# Patient Record
Sex: FEMALE | Race: WHITE | NOT HISPANIC OR LATINO | URBAN - METROPOLITAN AREA
[De-identification: names, ages, dates, MRNs, and addresses within clinical notes are randomized per-mention and may not be internally consistent; named-entity substitution may affect disease eponyms.]

---

## 2018-03-06 PROBLEM — Z00.00 ENCOUNTER FOR PREVENTIVE HEALTH EXAMINATION: Status: ACTIVE | Noted: 2018-03-06

## 2018-03-28 ENCOUNTER — OUTPATIENT (OUTPATIENT)
Dept: OUTPATIENT SERVICES | Facility: HOSPITAL | Age: 23
LOS: 1 days | End: 2018-03-28
Payer: COMMERCIAL

## 2018-03-28 ENCOUNTER — APPOINTMENT (OUTPATIENT)
Dept: SURGERY | Facility: CLINIC | Age: 23
End: 2018-03-28

## 2018-03-28 ENCOUNTER — APPOINTMENT (OUTPATIENT)
Dept: CT IMAGING | Facility: HOSPITAL | Age: 23
End: 2018-03-28
Payer: COMMERCIAL

## 2018-03-28 VITALS
OXYGEN SATURATION: 98 % | DIASTOLIC BLOOD PRESSURE: 70 MMHG | WEIGHT: 135.8 LBS | HEART RATE: 77 BPM | TEMPERATURE: 99 F | HEIGHT: 67 IN | RESPIRATION RATE: 16 BRPM | SYSTOLIC BLOOD PRESSURE: 116 MMHG

## 2018-03-28 DIAGNOSIS — Z41.9 ENCOUNTER FOR PROCEDURE FOR PURPOSES OTHER THAN REMEDYING HEALTH STATE, UNSPECIFIED: Chronic | ICD-10-CM

## 2018-03-28 DIAGNOSIS — Z01.818 ENCOUNTER FOR OTHER PREPROCEDURAL EXAMINATION: ICD-10-CM

## 2018-03-28 DIAGNOSIS — Z98.890 OTHER SPECIFIED POSTPROCEDURAL STATES: Chronic | ICD-10-CM

## 2018-03-28 DIAGNOSIS — N80.9 ENDOMETRIOSIS, UNSPECIFIED: ICD-10-CM

## 2018-03-28 PROBLEM — K59.00 CONSTIPATION: Status: ACTIVE | Noted: 2018-03-28

## 2018-03-28 PROBLEM — Z87.42 HISTORY OF ENDOMETRIOSIS: Status: RESOLVED | Noted: 2018-03-28 | Resolved: 2018-03-28

## 2018-03-28 PROCEDURE — 93010 ELECTROCARDIOGRAM REPORT: CPT

## 2018-03-28 PROCEDURE — 71250 CT THORAX DX C-: CPT | Mod: 26

## 2018-03-28 PROCEDURE — 71250 CT THORAX DX C-: CPT

## 2018-03-28 RX ORDER — IBUPROFEN 800 MG/1
TABLET, FILM COATED ORAL
Refills: 0 | Status: ACTIVE | COMMUNITY

## 2018-03-28 RX ORDER — RANITIDINE HCL 150 MG
150 CAPSULE ORAL DAILY
Refills: 0 | Status: ACTIVE | COMMUNITY

## 2018-03-28 RX ORDER — ACETAMINOPHEN 500 MG/1
TABLET ORAL
Refills: 0 | Status: ACTIVE | COMMUNITY

## 2018-03-28 RX ORDER — LORATADINE 5 MG
TABLET,CHEWABLE ORAL DAILY
Refills: 0 | Status: ACTIVE | COMMUNITY

## 2018-03-28 RX ORDER — EUCALYPT/MEN/CAMPH/TURP/WH.PET
OINTMENT (GRAM) TOPICAL
Refills: 0 | Status: ACTIVE | COMMUNITY

## 2018-03-28 RX ORDER — CETIRIZINE HCL 10 MG
10 TABLET ORAL DAILY
Refills: 0 | Status: ACTIVE | COMMUNITY

## 2018-03-28 NOTE — H&P PST ADULT - HISTORY OF PRESENT ILLNESS
22 year old reports heavy and very painful menstruations. She has a working diagnosis of endometriosis.

## 2018-03-28 NOTE — H&P PST ADULT - NSANTHOSAYNRD_GEN_A_CORE
No. JADA screening performed.  STOP BANG Legend: 0-2 = LOW Risk; 3-4 = INTERMEDIATE Risk; 5-8 = HIGH Risk

## 2018-03-28 NOTE — H&P PST ADULT - ADDITIONAL PE
No medical contraindication to operative hysteroscopy ,laparoscopic excision of endometriosis, pending normal labs.

## 2018-03-28 NOTE — ASU PATIENT PROFILE, ADULT - VISION (WITH CORRECTIVE LENSES IF THE PATIENT USUALLY WEARS THEM):
Glasses for distance/Partially impaired: cannot see medication labels or newsprint, but can see obstacles in path, and the surrounding layout; can count fingers at arm's length

## 2018-03-29 ENCOUNTER — APPOINTMENT (OUTPATIENT)
Dept: THORACIC SURGERY | Facility: CLINIC | Age: 23
End: 2018-03-29
Payer: COMMERCIAL

## 2018-03-29 VITALS
WEIGHT: 134.92 LBS | SYSTOLIC BLOOD PRESSURE: 124 MMHG | RESPIRATION RATE: 16 BRPM | TEMPERATURE: 98 F | OXYGEN SATURATION: 100 % | DIASTOLIC BLOOD PRESSURE: 60 MMHG | HEIGHT: 66 IN | HEART RATE: 85 BPM

## 2018-03-29 VITALS
RESPIRATION RATE: 19 BRPM | HEART RATE: 87 BPM | BODY MASS INDEX: 21.21 KG/M2 | WEIGHT: 132 LBS | OXYGEN SATURATION: 98 % | HEIGHT: 66 IN | TEMPERATURE: 98.3 F | SYSTOLIC BLOOD PRESSURE: 123 MMHG | DIASTOLIC BLOOD PRESSURE: 72 MMHG

## 2018-03-29 DIAGNOSIS — K59.00 CONSTIPATION, UNSPECIFIED: ICD-10-CM

## 2018-03-29 DIAGNOSIS — J45.909 UNSPECIFIED ASTHMA, UNCOMPLICATED: ICD-10-CM

## 2018-03-29 DIAGNOSIS — Z82.5 FAMILY HISTORY OF ASTHMA AND OTHER CHRONIC LOWER RESPIRATORY DISEASES: ICD-10-CM

## 2018-03-29 DIAGNOSIS — R07.1 CHEST PAIN ON BREATHING: ICD-10-CM

## 2018-03-29 DIAGNOSIS — Z87.42 PERSONAL HISTORY OF OTHER DISEASES OF THE FEMALE GENITAL TRACT: ICD-10-CM

## 2018-03-29 DIAGNOSIS — Z80.9 FAMILY HISTORY OF MALIGNANT NEOPLASM, UNSPECIFIED: ICD-10-CM

## 2018-03-29 DIAGNOSIS — R10.2 PELVIC AND PERINEAL PAIN: ICD-10-CM

## 2018-03-29 DIAGNOSIS — K21.9 GASTRO-ESOPHAGEAL REFLUX DISEASE W/OUT ESOPHAGITIS: ICD-10-CM

## 2018-03-29 DIAGNOSIS — Z82.49 FAMILY HISTORY OF ISCHEMIC HEART DISEASE AND OTHER DISEASES OF THE CIRCULATORY SYSTEM: ICD-10-CM

## 2018-03-29 DIAGNOSIS — R06.02 SHORTNESS OF BREATH: ICD-10-CM

## 2018-03-29 PROCEDURE — 99205 OFFICE O/P NEW HI 60 MIN: CPT

## 2018-03-29 RX ORDER — BACILLUS COAGULANS/INULIN 1B-250 MG
CAPSULE ORAL
Refills: 0 | Status: ACTIVE | COMMUNITY

## 2018-03-29 RX ORDER — L.ACIDOPH/B.ANIMALIS/B.LONGUM 15B CELL
0 CAPSULE ORAL
Qty: 0 | Refills: 0 | COMMUNITY

## 2018-03-29 RX ORDER — POLYETHYLENE GLYCOL 3350 17 G/17G
0 POWDER, FOR SOLUTION ORAL
Qty: 0 | Refills: 0 | COMMUNITY

## 2018-03-29 RX ORDER — CETIRIZINE HYDROCHLORIDE 10 MG/1
1 TABLET ORAL
Qty: 0 | Refills: 0 | COMMUNITY

## 2018-03-29 RX ORDER — RANITIDINE HYDROCHLORIDE 150 MG/1
40 TABLET, FILM COATED ORAL
Qty: 0 | Refills: 0 | COMMUNITY

## 2018-03-29 RX ORDER — SIMETHICONE 80 MG/1
0 TABLET, CHEWABLE ORAL
Qty: 0 | Refills: 0 | COMMUNITY

## 2018-03-29 RX ORDER — ACETAMINOPHEN 500 MG
0 TABLET ORAL
Qty: 0 | Refills: 0 | COMMUNITY

## 2018-03-29 RX ORDER — IBUPROFEN 200 MG
1 TABLET ORAL
Qty: 0 | Refills: 0 | COMMUNITY

## 2018-03-29 RX ORDER — ACETAMINOPHEN, DEXTROMETHORPHAN HYDROBROMIDE, AND DOXYLAMINE SUCCINATE 650; 30; 12.5 MG/30ML; MG/30ML; MG/30ML
0 LIQUID ORAL
Qty: 0 | Refills: 0 | COMMUNITY

## 2018-03-29 NOTE — ASU PREOP CHECKLIST - SELECT TESTS ORDERED
PT/PTT/urine c&s Ct chest; MRI pelvis/CBC/Urinalysis CBC/PT/PTT/UCG/INR/urine c&s Ct chest; MRI pelvis; Pregnancy Test- Negative/Urinalysis

## 2018-03-30 ENCOUNTER — OUTPATIENT (OUTPATIENT)
Dept: OUTPATIENT SERVICES | Facility: HOSPITAL | Age: 23
LOS: 1 days | Discharge: HOME | End: 2018-03-30
Payer: COMMERCIAL

## 2018-03-30 ENCOUNTER — RESULT REVIEW (OUTPATIENT)
Age: 23
End: 2018-03-30

## 2018-03-30 VITALS
DIASTOLIC BLOOD PRESSURE: 53 MMHG | RESPIRATION RATE: 18 BRPM | HEART RATE: 60 BPM | SYSTOLIC BLOOD PRESSURE: 101 MMHG | OXYGEN SATURATION: 99 % | TEMPERATURE: 98 F

## 2018-03-30 DIAGNOSIS — Z41.9 ENCOUNTER FOR PROCEDURE FOR PURPOSES OTHER THAN REMEDYING HEALTH STATE, UNSPECIFIED: Chronic | ICD-10-CM

## 2018-03-30 DIAGNOSIS — Z98.890 OTHER SPECIFIED POSTPROCEDURAL STATES: Chronic | ICD-10-CM

## 2018-03-30 PROCEDURE — 86900 BLOOD TYPING SEROLOGIC ABO: CPT

## 2018-03-30 PROCEDURE — 86850 RBC ANTIBODY SCREEN: CPT

## 2018-03-30 PROCEDURE — 88305 TISSUE EXAM BY PATHOLOGIST: CPT

## 2018-03-30 PROCEDURE — 58662 LAPAROSCOPY EXCISE LESIONS: CPT

## 2018-03-30 PROCEDURE — 86901 BLOOD TYPING SEROLOGIC RH(D): CPT

## 2018-03-30 PROCEDURE — 58558 HYSTEROSCOPY BIOPSY: CPT

## 2018-03-30 RX ORDER — KETOROLAC TROMETHAMINE 30 MG/ML
30 SYRINGE (ML) INJECTION EVERY 6 HOURS
Qty: 0 | Refills: 0 | Status: DISCONTINUED | OUTPATIENT
Start: 2018-03-30 | End: 2018-03-30

## 2018-03-30 RX ORDER — MORPHINE SULFATE 50 MG/1
4 CAPSULE, EXTENDED RELEASE ORAL
Qty: 0 | Refills: 0 | Status: DISCONTINUED | OUTPATIENT
Start: 2018-03-30 | End: 2018-03-30

## 2018-03-30 RX ORDER — ONDANSETRON 8 MG/1
2 TABLET, FILM COATED ORAL ONCE
Qty: 0 | Refills: 0 | Status: COMPLETED | OUTPATIENT
Start: 2018-03-30 | End: 2018-03-30

## 2018-03-30 RX ORDER — ACETAMINOPHEN 500 MG
975 TABLET ORAL EVERY 6 HOURS
Qty: 0 | Refills: 0 | Status: DISCONTINUED | OUTPATIENT
Start: 2018-03-30 | End: 2018-03-30

## 2018-03-30 RX ORDER — SODIUM CHLORIDE 9 MG/ML
1000 INJECTION, SOLUTION INTRAVENOUS
Qty: 0 | Refills: 0 | Status: DISCONTINUED | OUTPATIENT
Start: 2018-03-30 | End: 2018-03-30

## 2018-03-30 RX ADMIN — Medication 30 MILLIGRAM(S): at 10:06

## 2018-03-30 RX ADMIN — Medication 975 MILLIGRAM(S): at 14:09

## 2018-03-30 RX ADMIN — MORPHINE SULFATE 4 MILLIGRAM(S): 50 CAPSULE, EXTENDED RELEASE ORAL at 10:25

## 2018-03-30 RX ADMIN — MORPHINE SULFATE 4 MILLIGRAM(S): 50 CAPSULE, EXTENDED RELEASE ORAL at 10:06

## 2018-03-30 RX ADMIN — ONDANSETRON 2 MILLIGRAM(S): 8 TABLET, FILM COATED ORAL at 12:44

## 2018-03-30 RX ADMIN — MORPHINE SULFATE 4 MILLIGRAM(S): 50 CAPSULE, EXTENDED RELEASE ORAL at 10:49

## 2018-03-30 RX ADMIN — MORPHINE SULFATE 4 MILLIGRAM(S): 50 CAPSULE, EXTENDED RELEASE ORAL at 10:21

## 2018-03-30 RX ADMIN — Medication 30 MILLIGRAM(S): at 10:21

## 2018-03-30 NOTE — H&P ADULT - HISTORY OF PRESENT ILLNESS
22y G0 presenting for preop of l/s endometriosis excision. Pt has had pelvic pain for her whole life since menses. THe pain is bilateral and radiates to legs. Has constipation and urinary urgency.         Last Menstrual Period: 3w ago       PAST MEDICAL & SURGICAL HISTORY:  Acid reflux  Kidney infection: 09/2017  Endometriosis  Elective surgery: Excision of benign mole on lower right back 2016  History of appendectomy: 2006      SOCIAL HISTORY:    FAMILY HISTORY:  No pertinent family history in first degree relatives      MEDICATIONS  (STANDING):    MEDICATIONS  (PRN):      Allergies    No Known Allergies    Intolerances

## 2018-03-30 NOTE — H&P ADULT - ASSESSMENT
22y G0 for l/s endometriosis excision    postop orders:    1. Neuro/Pain:  IV toradol and tyelnol PRN  2  CV:   VS per routine  3. Pulm: Encourage ISS  4. GI: AAT  5. :  depending on surgery  6. Heme: AM CBC  7. ID: --  8. DVT ppx: SCDs  9. Dispo: Likely POD#1

## 2018-03-30 NOTE — H&P ADULT - NSHPPHYSICALEXAM_GEN_ALL_CORE
PHYSICAL EXAM:   Vital Signs Last 24 Hrs  T(C): 36.6 (29 Mar 2018 10:23), Max: 36.6 (29 Mar 2018 10:23)  T(F): 97.9 (29 Mar 2018 10:23), Max: 97.9 (29 Mar 2018 10:23)  HR: 85 (29 Mar 2018 10:23) (85 - 85)  BP: 124/60 (29 Mar 2018 10:23) (124/60 - 124/60)  BP(mean): --  RR: 16 (29 Mar 2018 10:23) (16 - 16)  SpO2: 100% (29 Mar 2018 10:23) (100% - 100%)    **************************  Constitutional: Alert & Oriented x3, No acute distress, cooperative   Respiratory: Clear to ausculation bilaterally; no wheezing, rhonchi, or crackles  Cardiovascular: S1 &S2 physiologic, no murmurs, or gallops  Gastrointestinal: soft, non tender, positive bowel sounds, no rebound or guarding   Speculum exam:   Pelvic exam:   Extremities: no calf tenderness or swelling

## 2018-04-06 LAB — SURGICAL PATHOLOGY STUDY: SIGNIFICANT CHANGE UP

## 2018-07-17 PROBLEM — N15.9 RENAL TUBULO-INTERSTITIAL DISEASE, UNSPECIFIED: Chronic | Status: ACTIVE | Noted: 2018-03-28

## 2019-12-25 PROBLEM — R10.2 PELVIC PAIN IN FEMALE: Status: ACTIVE | Noted: 2018-03-28
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
## 2023-03-09 NOTE — ASU PREOP CHECKLIST - IV STARTED
Abdomen , soft, nontender, nondistended , no guarding or rigidity , no masses palpable , normal bowel sounds , Liver and Spleen,  no hepatosplenomegaly , liver nontender no

## 2024-10-16 NOTE — ASU PREOP CHECKLIST - NSWEIGHTCALCTOOLDRUG_GEN_A_CORE
[Doing Well] : is doing well [Excellent Pain Control] : has excellent pain control [No Sign of Infection] : is showing no signs of infection  used